# Patient Record
Sex: FEMALE | Race: WHITE | Employment: STUDENT | URBAN - METROPOLITAN AREA
[De-identification: names, ages, dates, MRNs, and addresses within clinical notes are randomized per-mention and may not be internally consistent; named-entity substitution may affect disease eponyms.]

---

## 2024-03-27 ENCOUNTER — TELEPHONE (OUTPATIENT)
Dept: INTERNAL MEDICINE CLINIC | Facility: CLINIC | Age: 29
End: 2024-03-27

## 2024-03-27 ENCOUNTER — OFFICE VISIT (OUTPATIENT)
Dept: INTERNAL MEDICINE CLINIC | Facility: CLINIC | Age: 29
End: 2024-03-27

## 2024-03-27 DIAGNOSIS — R07.81 PLEURITIC CHEST PAIN: Primary | ICD-10-CM

## 2024-03-27 LAB — D DIMER PPP FEU-MCNC: 0.33 UG/ML FEU (ref ?–0.5)

## 2024-03-27 NOTE — PROGRESS NOTES
Abigail Pratt is a 28 year old female.  Chief Complaint   Patient presents with    Establish Care     NP here to establish care. C/O Mid Back Pain       HPI:   Abigail Pratt is a 28 year old female who presents for: chest/stomach pain        Wt Readings from Last 6 Encounters:   02/24/12 117 lb 8 oz (53.3 kg) (35%, Z= -0.37)*   02/16/11 121 lb (54.9 kg) (48%, Z= -0.05)*   06/29/10 118 lb (53.5 kg) (45%, Z= -0.13)*   06/19/09 116 lb (52.6 kg) (48%, Z= -0.06)*   06/20/08 102 lb (46.3 kg) (29%, Z= -0.56)*   07/31/07 96 lb (43.5 kg) (29%, Z= -0.56)*     * Growth percentiles are based on [facility] (Girls, 2-20 Years) data.     There is no height or weight on file to calculate BMI.       Current Outpatient Medications   Medication Sig Dispense Refill    Mometasone Furoate (NASONEX) 50 MCG/ACT Nasal Suspension 1 spray QD 1 Inhaler 0      No past medical history on file.   No past surgical history on file.   Family History   Problem Relation Age of Onset    High Cholesterol Maternal Grandmother     Heart Disease Maternal Grandfather     Heart Disease Paternal Grandmother     High Blood Pressure Paternal Grandmother     Diabetes Paternal Grandfather     High Blood Pressure Paternal Grandfather       Social History:   Social History     Socioeconomic History    Marital status: Single   Tobacco Use    Smoking status: Never          REVIEW OF SYSTEMS:   GENERAL: feels well otherwise      EXAM:   There were no vitals taken for this visit.    GENERAL: well developed, well nourished, in no apparent distress  HEENT: normal oropharynx without erythema or exudate, normal TM's, no sinus tenderness, nares patent  LUNGS: clear to auscultation bilaterally, no wheezing or rhonchi  GI: no abdominal discomfort  Ttp lower thoracic paraspinal musculature      ASSESSMENT AND PLAN:     1. Pleuritic chest pain  Check ddimer  - D-Dimer [E]; Future      Mercedes Hanley DO  3/27/2024  11:28 AM

## 2024-04-05 ENCOUNTER — TELEPHONE (OUTPATIENT)
Dept: INTERNAL MEDICINE CLINIC | Facility: CLINIC | Age: 29
End: 2024-04-05

## 2024-04-05 DIAGNOSIS — G43.809 OTHER MIGRAINE WITHOUT STATUS MIGRAINOSUS, NOT INTRACTABLE: Primary | ICD-10-CM

## 2024-04-05 RX ORDER — RIMEGEPANT SULFATE 75 MG/75MG
1 TABLET, ORALLY DISINTEGRATING ORAL DAILY PRN
Qty: 16 TABLET | Refills: 3 | Status: SHIPPED | OUTPATIENT
Start: 2024-04-05 | End: 2024-07-04

## 2024-04-08 NOTE — TELEPHONE ENCOUNTER
S/w patient and pharmacy. PA needed for [facility].   ePA initiated.   ID#[id number]  Phone: [phone]    Waiting for Payer Response   4/8/2024  9:57 AM  Case ID: [id number] Reply deadline: April 10, 2024  9:53 AM Sending user: Estela Haskins RN   Note from payer: Prescriber details have been updated to match the prescriber directory.   Note to payer: Patient has tried and failed Imitrex, Ibuprofen & Excedrin--all ineffective.   Payer: [employer] [facility]    [phone]

## 2025-02-10 ENCOUNTER — TELEPHONE (OUTPATIENT)
Dept: INTERNAL MEDICINE CLINIC | Facility: CLINIC | Age: 30
End: 2025-02-10

## 2025-02-10 NOTE — TELEPHONE ENCOUNTER
Pt called to inquire about prescription for Nurtec.   Spoke with Brennon and was informed that pt has 2 refills remaining.    Pt notified that new Rx not needed at this time.

## 2025-04-04 ENCOUNTER — TELEPHONE (OUTPATIENT)
Dept: INTERNAL MEDICINE CLINIC | Facility: CLINIC | Age: 30
End: 2025-04-04

## 2025-04-04 RX ORDER — RIMEGEPANT SULFATE 75 MG/75MG
75 TABLET, ORALLY DISINTEGRATING ORAL DAILY PRN
Qty: 16 TABLET | Refills: 3 | Status: SHIPPED | OUTPATIENT
Start: 2025-04-04

## 2025-04-07 NOTE — TELEPHONE ENCOUNTER
Prior Authorization History  Rimegepant Sulfate (NURTEC) 75 MG Oral Tablet Dispersible     Approval Details    Authorized from March 5, 2025 to April 4, 2026  Information received electronically from payer     History    View all authorizations for this medication  Approved   4/4/2025  5:00 PM  Appeal supported: No   Note from payer: The case has been Approved from  [id number]   Payer: [employer] [facility] Case ID: [id number]    [phone]    [phone]